# Patient Record
Sex: MALE | Race: WHITE | Employment: FULL TIME | URBAN - METROPOLITAN AREA
[De-identification: names, ages, dates, MRNs, and addresses within clinical notes are randomized per-mention and may not be internally consistent; named-entity substitution may affect disease eponyms.]

---

## 2022-12-10 ENCOUNTER — HOSPITAL ENCOUNTER (OUTPATIENT)
Age: 37
Discharge: HOME | End: 2022-12-10
Payer: COMMERCIAL

## 2022-12-10 ENCOUNTER — OFFICE VISIT (OUTPATIENT)
Dept: FAMILY MEDICINE CLINIC | Facility: CLINIC | Age: 37
End: 2022-12-10
Payer: COMMERCIAL

## 2022-12-10 VITALS
TEMPERATURE: 98 F | RESPIRATION RATE: 18 BRPM | DIASTOLIC BLOOD PRESSURE: 78 MMHG | SYSTOLIC BLOOD PRESSURE: 124 MMHG | HEART RATE: 72 BPM | OXYGEN SATURATION: 99 %

## 2022-12-10 VITALS
OXYGEN SATURATION: 97 % | SYSTOLIC BLOOD PRESSURE: 112 MMHG | BODY MASS INDEX: 27.35 KG/M2 | DIASTOLIC BLOOD PRESSURE: 72 MMHG | TEMPERATURE: 98 F | HEART RATE: 72 BPM | RESPIRATION RATE: 16 BRPM | HEIGHT: 71 IN | WEIGHT: 195.38 LBS

## 2022-12-10 DIAGNOSIS — Z02.9 ENCOUNTERS FOR ADMINISTRATIVE PURPOSE: Primary | ICD-10-CM

## 2022-12-10 DIAGNOSIS — B02.8 HERPES ZOSTER WITH COMPLICATION: Primary | ICD-10-CM

## 2022-12-10 PROCEDURE — 3078F DIAST BP <80 MM HG: CPT | Performed by: NURSE PRACTITIONER

## 2022-12-10 PROCEDURE — 99203 OFFICE O/P NEW LOW 30 MIN: CPT

## 2022-12-10 PROCEDURE — 3074F SYST BP LT 130 MM HG: CPT | Performed by: NURSE PRACTITIONER

## 2022-12-10 PROCEDURE — 3008F BODY MASS INDEX DOCD: CPT | Performed by: NURSE PRACTITIONER

## 2022-12-10 RX ORDER — OFLOXACIN 3 MG/ML
2 SOLUTION/ DROPS OPHTHALMIC 4 TIMES DAILY
Qty: 5 ML | Refills: 0 | Status: SHIPPED | OUTPATIENT
Start: 2022-12-10 | End: 2022-12-15

## 2022-12-10 RX ORDER — VALACYCLOVIR HYDROCHLORIDE 1 G/1
1000 TABLET, FILM COATED ORAL 3 TIMES DAILY
Qty: 21 TABLET | Refills: 0 | Status: SHIPPED | OUTPATIENT
Start: 2022-12-10 | End: 2022-12-17

## 2022-12-10 NOTE — ED INITIAL ASSESSMENT (HPI)
Pt presents with right eye redness, swelling to upper and lower eyelids, and rash x 3-4 days, c/o pain on right eye, no visual change.

## 2022-12-10 NOTE — DISCHARGE INSTRUCTIONS
Follow-up with your ophthalmologist take the first available appointment. You may take over-the-counter antihistamine such as 24-hour Zyrtec or Claritin to help with any itchy skin and avoid touching the outer eye and touching the eye itself as the lesions can spread directly onto the eye   If you develop any blurry vision vision changes increase in swelling around the eye any lesions directly on the eye any pus or drainage from the eye feel like you are about to lose vision severe headache or fever neck stiffness nausea or vomiting go to the nearest emergency department.

## 2023-01-16 ENCOUNTER — OFFICE VISIT (OUTPATIENT)
Dept: INTERNAL MEDICINE CLINIC | Facility: CLINIC | Age: 38
End: 2023-01-16
Payer: COMMERCIAL

## 2023-01-16 VITALS
HEIGHT: 71 IN | SYSTOLIC BLOOD PRESSURE: 116 MMHG | DIASTOLIC BLOOD PRESSURE: 62 MMHG | BODY MASS INDEX: 27.3 KG/M2 | RESPIRATION RATE: 15 BRPM | TEMPERATURE: 97 F | OXYGEN SATURATION: 98 % | WEIGHT: 195 LBS | HEART RATE: 101 BPM

## 2023-01-16 DIAGNOSIS — L64.9 ANDROGENETIC ALOPECIA: ICD-10-CM

## 2023-01-16 DIAGNOSIS — E66.3 OVERWEIGHT (BMI 25.0-29.9): ICD-10-CM

## 2023-01-16 DIAGNOSIS — Z00.00 HEALTH MAINTENANCE EXAMINATION: Primary | ICD-10-CM

## 2023-01-16 DIAGNOSIS — J30.2 SEASONAL ALLERGIES: ICD-10-CM

## 2023-01-16 DIAGNOSIS — Z23 NEED FOR INFLUENZA VACCINATION: ICD-10-CM

## 2023-01-16 PROBLEM — E78.2 MIXED HYPERLIPIDEMIA: Status: ACTIVE | Noted: 2023-01-16

## 2023-01-16 PROBLEM — R74.8 ELEVATED LIVER ENZYMES: Status: ACTIVE | Noted: 2023-01-16

## 2023-01-16 PROBLEM — E03.8 SUBCLINICAL HYPOTHYROIDISM: Status: ACTIVE | Noted: 2023-01-16

## 2023-01-16 LAB
ALBUMIN SERPL-MCNC: 4.5 G/DL (ref 3.4–5)
ALBUMIN/GLOB SERPL: 1.2 {RATIO} (ref 1–2)
ALP LIVER SERPL-CCNC: 61 U/L
ALT SERPL-CCNC: 66 U/L
ANION GAP SERPL CALC-SCNC: 5 MMOL/L (ref 0–18)
AST SERPL-CCNC: 32 U/L (ref 15–37)
BASOPHILS # BLD AUTO: 0.05 X10(3) UL (ref 0–0.2)
BASOPHILS NFR BLD AUTO: 1 %
BILIRUB SERPL-MCNC: 0.8 MG/DL (ref 0.1–2)
BUN BLD-MCNC: 12 MG/DL (ref 7–18)
BUN/CREAT SERPL: 10.9 (ref 10–20)
CALCIUM BLD-MCNC: 9.7 MG/DL (ref 8.5–10.1)
CHLORIDE SERPL-SCNC: 105 MMOL/L (ref 98–112)
CHOLEST SERPL-MCNC: 232 MG/DL (ref ?–200)
CO2 SERPL-SCNC: 26 MMOL/L (ref 21–32)
CREAT BLD-MCNC: 1.1 MG/DL
DEPRECATED RDW RBC AUTO: 40.9 FL (ref 35.1–46.3)
EOSINOPHIL # BLD AUTO: 0.51 X10(3) UL (ref 0–0.7)
EOSINOPHIL NFR BLD AUTO: 10.2 %
ERYTHROCYTE [DISTWIDTH] IN BLOOD BY AUTOMATED COUNT: 12.3 % (ref 11–15)
EST. AVERAGE GLUCOSE BLD GHB EST-MCNC: 100 MG/DL (ref 68–126)
FASTING PATIENT LIPID ANSWER: YES
FASTING STATUS PATIENT QL REPORTED: YES
GFR SERPLBLD BASED ON 1.73 SQ M-ARVRAT: 88 ML/MIN/1.73M2 (ref 60–?)
GLOBULIN PLAS-MCNC: 3.9 G/DL (ref 2.8–4.4)
GLUCOSE BLD-MCNC: 90 MG/DL (ref 70–99)
HBA1C MFR BLD: 5.1 % (ref ?–5.7)
HCT VFR BLD AUTO: 44.7 %
HCV AB SERPL QL IA: NONREACTIVE
HDLC SERPL-MCNC: 40 MG/DL (ref 40–59)
HGB BLD-MCNC: 15.1 G/DL
IMM GRANULOCYTES # BLD AUTO: 0.01 X10(3) UL (ref 0–1)
IMM GRANULOCYTES NFR BLD: 0.2 %
LDLC SERPL CALC-MCNC: 141 MG/DL (ref ?–100)
LYMPHOCYTES # BLD AUTO: 2.02 X10(3) UL (ref 1–4)
LYMPHOCYTES NFR BLD AUTO: 40.2 %
MCH RBC QN AUTO: 30.8 PG (ref 26–34)
MCHC RBC AUTO-ENTMCNC: 33.8 G/DL (ref 31–37)
MCV RBC AUTO: 91 FL
MONOCYTES # BLD AUTO: 0.4 X10(3) UL (ref 0.1–1)
MONOCYTES NFR BLD AUTO: 8 %
NEUTROPHILS # BLD AUTO: 2.03 X10 (3) UL (ref 1.5–7.7)
NEUTROPHILS # BLD AUTO: 2.03 X10(3) UL (ref 1.5–7.7)
NEUTROPHILS NFR BLD AUTO: 40.4 %
NONHDLC SERPL-MCNC: 192 MG/DL (ref ?–130)
OSMOLALITY SERPL CALC.SUM OF ELEC: 281 MOSM/KG (ref 275–295)
PLATELET # BLD AUTO: 220 10(3)UL (ref 150–450)
POTASSIUM SERPL-SCNC: 4.1 MMOL/L (ref 3.5–5.1)
PROT SERPL-MCNC: 8.4 G/DL (ref 6.4–8.2)
RBC # BLD AUTO: 4.91 X10(6)UL
SODIUM SERPL-SCNC: 136 MMOL/L (ref 136–145)
T4 FREE SERPL-MCNC: 0.9 NG/DL (ref 0.8–1.7)
TRIGL SERPL-MCNC: 279 MG/DL (ref 30–149)
TSI SER-ACNC: 4.05 MIU/ML (ref 0.36–3.74)
VLDLC SERPL CALC-MCNC: 53 MG/DL (ref 0–30)
WBC # BLD AUTO: 5 X10(3) UL (ref 4–11)

## 2023-01-16 PROCEDURE — 90471 IMMUNIZATION ADMIN: CPT | Performed by: FAMILY MEDICINE

## 2023-01-16 PROCEDURE — 99385 PREV VISIT NEW AGE 18-39: CPT | Performed by: FAMILY MEDICINE

## 2023-01-16 PROCEDURE — 84439 ASSAY OF FREE THYROXINE: CPT | Performed by: FAMILY MEDICINE

## 2023-01-16 PROCEDURE — 80050 GENERAL HEALTH PANEL: CPT | Performed by: FAMILY MEDICINE

## 2023-01-16 PROCEDURE — 83036 HEMOGLOBIN GLYCOSYLATED A1C: CPT | Performed by: FAMILY MEDICINE

## 2023-01-16 PROCEDURE — 3074F SYST BP LT 130 MM HG: CPT | Performed by: FAMILY MEDICINE

## 2023-01-16 PROCEDURE — 90686 IIV4 VACC NO PRSV 0.5 ML IM: CPT | Performed by: FAMILY MEDICINE

## 2023-01-16 PROCEDURE — 36415 COLL VENOUS BLD VENIPUNCTURE: CPT | Performed by: FAMILY MEDICINE

## 2023-01-16 PROCEDURE — 3008F BODY MASS INDEX DOCD: CPT | Performed by: FAMILY MEDICINE

## 2023-01-16 PROCEDURE — 86803 HEPATITIS C AB TEST: CPT | Performed by: FAMILY MEDICINE

## 2023-01-16 PROCEDURE — 3078F DIAST BP <80 MM HG: CPT | Performed by: FAMILY MEDICINE

## 2023-01-16 PROCEDURE — 80061 LIPID PANEL: CPT | Performed by: FAMILY MEDICINE

## 2023-01-16 PROCEDURE — 87389 HIV-1 AG W/HIV-1&-2 AB AG IA: CPT | Performed by: FAMILY MEDICINE

## 2023-01-16 NOTE — PATIENT INSTRUCTIONS
PATIENT INSTRUCTIONS    Thank you for seeing me today, it was a pleasure taking care of you. Please check out at the  and schedule a follow up appointment. Return in about 1 year (around 1/16/2024) for physical.  Please get your labs drawn - you may need to schedule a lab appointment if this was not completed at your recent doctor's visit. The following imaging studies were ordered: None  Please also follow up with the following specialists: None. Can chat with family about colon cancer history. Please fill out the advance directive information (power of  documents) and bring a copy to our clinic.   Healthy diet and exercise  COVID booster at some point in time    Best,   Dr. Lian Dorado

## 2023-01-16 NOTE — ASSESSMENT & PLAN NOTE
Using rogaine. Discussed finasteride, can hold off for now. Can consider referral to dermatology if he would like.

## 2023-01-16 NOTE — ASSESSMENT & PLAN NOTE
Exercise and diet advised. CBC, CMP, lipid panel, Hba1c, TSH, HIV screen, hepatitis C screen  Flu shot today. Advanced directive information provided. Advised COVID vaccine booster. May benefit from earlier colonoscopy given family history - will discuss at 35.

## 2023-07-21 ENCOUNTER — OFFICE VISIT (OUTPATIENT)
Dept: FAMILY MEDICINE CLINIC | Facility: CLINIC | Age: 38
End: 2023-07-21
Payer: COMMERCIAL

## 2023-07-21 VITALS
OXYGEN SATURATION: 98 % | SYSTOLIC BLOOD PRESSURE: 119 MMHG | TEMPERATURE: 99 F | DIASTOLIC BLOOD PRESSURE: 83 MMHG | HEART RATE: 78 BPM | BODY MASS INDEX: 26.6 KG/M2 | HEIGHT: 71 IN | RESPIRATION RATE: 18 BRPM | WEIGHT: 190 LBS

## 2023-07-21 DIAGNOSIS — R05.9 COUGH IN ADULT: ICD-10-CM

## 2023-07-21 DIAGNOSIS — J01.90 SUBACUTE SINUSITIS, UNSPECIFIED LOCATION: Primary | ICD-10-CM

## 2023-07-21 PROCEDURE — 3008F BODY MASS INDEX DOCD: CPT | Performed by: NURSE PRACTITIONER

## 2023-07-21 PROCEDURE — 3074F SYST BP LT 130 MM HG: CPT | Performed by: NURSE PRACTITIONER

## 2023-07-21 PROCEDURE — 3079F DIAST BP 80-89 MM HG: CPT | Performed by: NURSE PRACTITIONER

## 2023-07-21 PROCEDURE — 99213 OFFICE O/P EST LOW 20 MIN: CPT | Performed by: NURSE PRACTITIONER

## 2023-07-21 RX ORDER — AMOXICILLIN AND CLAVULANATE POTASSIUM 875; 125 MG/1; MG/1
1 TABLET, FILM COATED ORAL 2 TIMES DAILY
Qty: 14 TABLET | Refills: 0 | Status: SHIPPED | OUTPATIENT
Start: 2023-07-21 | End: 2023-07-28

## 2023-07-21 RX ORDER — BENZONATATE 200 MG/1
200 CAPSULE ORAL 3 TIMES DAILY PRN
Qty: 30 CAPSULE | Refills: 0 | Status: SHIPPED | OUTPATIENT
Start: 2023-07-21

## 2023-07-21 RX ORDER — FLUTICASONE PROPIONATE 50 MCG
2 SPRAY, SUSPENSION (ML) NASAL NIGHTLY
Qty: 1 EACH | Refills: 0 | Status: SHIPPED | OUTPATIENT
Start: 2023-07-21

## 2023-07-21 NOTE — PATIENT INSTRUCTIONS
If prescribed, take antibiotics as directed. Finish all the medication even if you feel better. Probiotics or 1-2 servings of yogurt daily during antibiotic use will help decrease stomach upset and restore good bacteria to the gut/prevent antibiotic associated diarrhea. Separate times by at least 2-4 hours. General comfort measures:  Get rest!  Hydrate! (cold or hot based on comfort). Drink lots of water or other non dehydrating liquids to help with illness. Salty foods, soups and tea can help with throat pain. Hand washing-use hand  or wash hands frequently, cover your cough or sneeze, do not share towels or drinks with others. Salt water gargles (1 tsp. Salt in 6 oz lukewarm water): gargle for 2 minutes, repeat every 15 minutes as needed to help decrease swelling and relieve pain. Use humidified air, steamy showers/baths and use vaporizer in sleeping quarters to keep secretions thin. Avoid smoking. Symptom management:    Nasal congestion/Post-nasal-drip: Saline nasal spray to nostrils to help remove drainage or an antihistamine to help dry up drainage. Sinus congestion/Post-nasal-drip: OTC Nasacort or Flonase (steroid nasal spray) nightly for 2 weeks. May take Sudafed (D) or Sudafed-PE, if not contraindicated (do not take if you have HTN). Pain/discomfort:  May use Tylenol or Ibuprofen, if not contraindicated. Cough: May take DM-dextromethorophan over the counter (long lasting). Ex: Delsym  Chest congestion:  May take guaifenesin with a lot of water. Ex:  Plain Mucinex. Sore throat:  Cepacol lozenges or Chloroseptic throat spray (active ingredient Benzocaine). Follow up in a few days if worsening symptoms. Seek immediate care if inability to swallow or breathe.

## 2024-09-06 ENCOUNTER — OFFICE VISIT (OUTPATIENT)
Dept: INTERNAL MEDICINE CLINIC | Facility: CLINIC | Age: 39
End: 2024-09-06
Payer: COMMERCIAL

## 2024-09-06 VITALS
OXYGEN SATURATION: 99 % | DIASTOLIC BLOOD PRESSURE: 80 MMHG | BODY MASS INDEX: 26.74 KG/M2 | HEART RATE: 68 BPM | SYSTOLIC BLOOD PRESSURE: 126 MMHG | WEIGHT: 191 LBS | TEMPERATURE: 98 F | HEIGHT: 71 IN

## 2024-09-06 DIAGNOSIS — Z00.00 HEALTH MAINTENANCE EXAMINATION: Primary | ICD-10-CM

## 2024-09-06 DIAGNOSIS — R74.8 ELEVATED LIVER ENZYMES: ICD-10-CM

## 2024-09-06 DIAGNOSIS — E03.8 SUBCLINICAL HYPOTHYROIDISM: ICD-10-CM

## 2024-09-06 DIAGNOSIS — E78.2 MIXED HYPERLIPIDEMIA: ICD-10-CM

## 2024-09-06 DIAGNOSIS — E66.3 OVERWEIGHT (BMI 25.0-29.9): ICD-10-CM

## 2024-09-06 DIAGNOSIS — J30.2 SEASONAL ALLERGIES: ICD-10-CM

## 2024-09-06 DIAGNOSIS — L64.9 ANDROGENETIC ALOPECIA: ICD-10-CM

## 2024-09-06 LAB
ALBUMIN SERPL-MCNC: 4.7 G/DL (ref 3.2–4.8)
ALBUMIN/GLOB SERPL: 1.3 {RATIO} (ref 1–2)
ALP LIVER SERPL-CCNC: 55 U/L
ALT SERPL-CCNC: 59 U/L
ANION GAP SERPL CALC-SCNC: 8 MMOL/L (ref 0–18)
AST SERPL-CCNC: 30 U/L (ref ?–34)
BILIRUB SERPL-MCNC: 0.6 MG/DL (ref 0.3–1.2)
BUN BLD-MCNC: 9 MG/DL (ref 9–23)
BUN/CREAT SERPL: 7.6 (ref 10–20)
CALCIUM BLD-MCNC: 10 MG/DL (ref 8.7–10.4)
CHLORIDE SERPL-SCNC: 104 MMOL/L (ref 98–112)
CHOLEST SERPL-MCNC: 218 MG/DL (ref ?–200)
CO2 SERPL-SCNC: 26 MMOL/L (ref 21–32)
CREAT BLD-MCNC: 1.19 MG/DL
EGFRCR SERPLBLD CKD-EPI 2021: 79 ML/MIN/1.73M2 (ref 60–?)
FASTING PATIENT LIPID ANSWER: YES
FASTING STATUS PATIENT QL REPORTED: YES
GLOBULIN PLAS-MCNC: 3.6 G/DL (ref 2–3.5)
GLUCOSE BLD-MCNC: 90 MG/DL (ref 70–99)
HDLC SERPL-MCNC: 38 MG/DL (ref 40–59)
LDLC SERPL CALC-MCNC: 119 MG/DL (ref ?–100)
NONHDLC SERPL-MCNC: 180 MG/DL (ref ?–130)
OSMOLALITY SERPL CALC.SUM OF ELEC: 284 MOSM/KG (ref 275–295)
POTASSIUM SERPL-SCNC: 5 MMOL/L (ref 3.5–5.1)
PROT SERPL-MCNC: 8.3 G/DL (ref 5.7–8.2)
SODIUM SERPL-SCNC: 138 MMOL/L (ref 136–145)
TRIGL SERPL-MCNC: 350 MG/DL (ref 30–149)
TSI SER-ACNC: 3.48 MIU/ML (ref 0.55–4.78)
VLDLC SERPL CALC-MCNC: 63 MG/DL (ref 0–30)

## 2024-09-06 PROCEDURE — 80061 LIPID PANEL: CPT | Performed by: FAMILY MEDICINE

## 2024-09-06 PROCEDURE — 80053 COMPREHEN METABOLIC PANEL: CPT | Performed by: FAMILY MEDICINE

## 2024-09-06 PROCEDURE — 84443 ASSAY THYROID STIM HORMONE: CPT | Performed by: FAMILY MEDICINE

## 2024-09-06 PROCEDURE — 99396 PREV VISIT EST AGE 40-64: CPT | Performed by: FAMILY MEDICINE

## 2024-09-06 PROCEDURE — 3074F SYST BP LT 130 MM HG: CPT | Performed by: FAMILY MEDICINE

## 2024-09-06 PROCEDURE — 3008F BODY MASS INDEX DOCD: CPT | Performed by: FAMILY MEDICINE

## 2024-09-06 PROCEDURE — 3079F DIAST BP 80-89 MM HG: CPT | Performed by: FAMILY MEDICINE

## 2024-09-06 RX ORDER — MINOXIDIL 5 %
FOAM (GRAM) TOPICAL
COMMUNITY

## 2024-09-06 NOTE — PATIENT INSTRUCTIONS
PATIENT INSTRUCTIONS    Thank you for seeing me today, it was a pleasure taking care of you.  Please check out at the  and schedule a follow up appointment.  Return in about 1 year (around 9/6/2025) for physical.  Please remember that the veronica period for all appointments is 5 minutes. This is to help maximize the amount of time that we can spend together at our visits.    Please get your labs drawn - you may need to schedule a lab appointment if this was not completed at your recent doctor's visit.  The following imaging studies were ordered: None  Please also follow up with the following specialists: None  Please fill out the advance directive information (power of  documents) and bring a copy to our clinic.  Continue to focus on a healthy diet and exercise  Mediterranean diet  Can continue current medications    Best,   Dr. Zhou

## 2024-09-06 NOTE — PROGRESS NOTES
FAMILY MEDICINE CLINIC NOTE    HPI  Bc Gallegos is a 39 year old male presenting for physical    #Health Maintenance  -Diet: Could be better. Picky eater. Not a big fan of vegetables. Does eat whole grain and fruits.   -Exercise: 2 days a week - running on treadmill.   -Lung cancer screen: Not indicated  -Colon cancer screen: Reports update - no family history of colon cancer. Will screen at 45.   -Prostate cancer screen: Not indicated  -Aortic aneurysm screen: Has family history, will screen at 65.   -Statin:  Will check lipid panel  -ASA: Not indicated  -HIV screen: 1/2023 negative  -Hep C screen: 1/2023 negative  -Gonorrhea/chlamydia:  Not indicated  -Syphillis: Not indicated  -TB: Not indicated  -Tobacco/alcohol: Per below  -Depression: PHQ-2 score of 0 (score >/= 3 do PHQ-9)  -Advanced Directive: Indicated     #Immunizations  -Tdap: 11/2018  -Flu shot: Indicated  -PCV13: Not indicated   -PCV20: Not indicated   -PPSV23: Not indicated   -HPV: Not indicated  -RZV (preferred) or VZL: Indicated   -RSV: Not indicated  -COVID: Pfizer    #Subclinical hypothyroidism  -TSH 1/2023     #HLD  -will monitor lipid panel    #Androgenetic alopecia  -rogaine    #Elevated liver enzymes  -will monitor    #Seasonal allergies  -Otc allergy medications as needed  -this year well managed     #Patient Care Team  Patient Care Team:  Ed Zhou MD as PCP - General (Family Medicine)    ROS  GENERAL: No fever/chills, no recent weight loss   HEENT: No visual changes, no changes in hearing, no sore throats  NECK: No pain, no swelling  RESP: No cough, no SOB  CV: No chest pain, no palpitations  GI: No abd pain, no N/V/D  MSK: No edema, no pain  SKIN: No new rashes  NEURO: No numbness, no tingling, no HA    HEALTH MAINTENANCE CHECKLIST  Health Maintenance Topics with due status: Overdue       Topic Date Due    Annual Depression Screening 01/01/2024    Annual Physical 01/16/2024    COVID-19 Vaccine 09/01/2024       ALLERGIES  No  Known Allergies    MEDICATIONS  Current Outpatient Medications   Medication Sig Dispense Refill    Minoxidil (ROGAINE MENS) 5 % External Foam Apply topically.         ACTIVE PROBLEM  Patient Active Problem List   Diagnosis    Androgenetic alopecia    Seasonal allergies    Health maintenance examination    Overweight (BMI 25.0-29.9)    Mixed hyperlipidemia    Subclinical hypothyroidism    Elevated liver enzymes       PAST MEDICAL HISTORY  Past Medical History:    Androgenetic alopecia    Mixed hyperlipidemia    Seasonal allergies    Shingles       PAST SOCIAL HISTORY  Social History     Socioeconomic History    Marital status:      Spouse name: Not on file    Number of children: Not on file    Years of education: Not on file    Highest education level: Not on file   Occupational History    Not on file   Tobacco Use    Smoking status: Never    Smokeless tobacco: Never   Vaping Use    Vaping status: Never Used   Substance and Sexual Activity    Alcohol use: Yes     Comment: Occasional - weekends - 4 drinks over the week    Drug use: Never    Sexual activity: Yes     Partners: Female     Birth control/protection: OCP   Other Topics Concern    Not on file   Social History Narrative    Relationships: Wife - Rosalba     Children: Aron (5M), Mica (3F)     Pets: None    School: N/A    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     Origin: From the [facility].     Interests: Enjoys running. Biking - hybrid with kids.     Spiritual: Not Anabaptism, not spiritual     Social Determinants of Health     Financial Resource Strain: Not on file   Food Insecurity: Not on file   Transportation Needs: Not on file   Physical Activity: Not on file   Stress: Not on file   Social Connections: Not on file   Housing Stability: Not on file       PAST SURGICAL HISTORY  History reviewed. No pertinent surgical history.    PAST FAMILY HISTORY  Family History   Problem Relation Age of Onset     Hyperlipidemia Mother     Kidney Disease Mother         Congenital    Heart Attack Mother 64    Skin cancer Father         Basal cell    Thyroid disease Father     Allergies Father     Hyperlipidemia Father     No Known Problems Sister     No Known Problems Brother     COPD Maternal Grandmother     Hyperlipidemia Maternal Grandmother     Other (Aortic aneurysm) Maternal Grandmother     Cancer Maternal Grandfather         Bladder    Cancer Paternal Grandmother         Lung    Cancer Paternal Grandfather         Throat    Prostate Cancer Neg        PHYSICAL EXAM  Vitals:    09/06/24 0804   BP: 126/80   Pulse: 68   Temp: 97.8 °F (36.6 °C)   SpO2: 99%   Weight: 191 lb (86.6 kg)   Height: 5' 11\" (1.803 m)      Body mass index is 26.64 kg/m².    GENERAL: NAD  HEENT: Moist mucous membranes, no tonsillar swelling or erythema, PERRLA bilat, TM translucent and non-bulging  NECK: Supple, non-tender  RESP: CTAB, no wheezing, no rales, no ronchi  CV: RRR, no murmurs  GI: Soft, non-distended, non-tender, no guarding, no rebound, no masses  MSK: No edema  SKIN: Warm and dry, no rashes  NEURO: Answering questions appropriately    LABS  Lab Results   Component Value Date    WBC 5.0 01/16/2023    HGB 15.1 01/16/2023    HCT 44.7 01/16/2023    .0 01/16/2023    NEPERCENT 40.4 01/16/2023    LYPERCENT 40.2 01/16/2023    MOPERCENT 8.0 01/16/2023    EOPERCENT 10.2 01/16/2023    BAPERCENT 1.0 01/16/2023    NE 2.03 01/16/2023    LYMABS 2.02 01/16/2023    MOABSO 0.40 01/16/2023    EOABSO 0.51 01/16/2023    BAABSO 0.05 01/16/2023       Lab Results   Component Value Date     01/16/2023    K 4.1 01/16/2023     01/16/2023    CO2 26.0 01/16/2023    ANIONGAP 5 01/16/2023    BUN 12 01/16/2023    CREATSERUM 1.10 01/16/2023    BUNCREA 10.9 01/16/2023    GLU 90 01/16/2023    CA 9.7 01/16/2023    OSMOCALC 281 01/16/2023    ALT 66 (H) 01/16/2023    AST 32 01/16/2023    ALKPHO 61 01/16/2023    BILT 0.8 01/16/2023    TP 8.4 (H) 01/16/2023     ALB 4.5 01/16/2023    GLOBULIN 3.9 01/16/2023    ELECTAG 1.2 01/16/2023    FASTING Yes 01/16/2023    FASTING Yes 01/16/2023         Lab Results   Component Value Date    CHOLEST 232 (H) 01/16/2023    TRIG 279 (H) 01/16/2023    HDL 40 01/16/2023     (H) 01/16/2023    VLDL 53 (H) 01/16/2023    NONHDLC 192 (H) 01/16/2023        DIAGNOSTICS    ASSESSMENT/PLAN  Problem List Items Addressed This Visit          Cardiac and Vasculature    Mixed hyperlipidemia     Monitor CMP and lipid panel.  Discussed healthy diet and exercise  Mother recently passed away of a heart attack at the age of 64  Discussed ASCVD risk score and how that influences management of statin.  Can consider starting statin earlier if this is patient's preference         Relevant Orders    Lipid Panel    Comp Metabolic Panel (14)       Endocrine and Metabolic    Overweight (BMI 25.0-29.9)     Healthy diet and exercise advised.         Relevant Orders    Lipid Panel    TSH W Reflex To Free T4    Comp Metabolic Panel (14)    Subclinical hypothyroidism     Monitor TSH         Relevant Orders    TSH W Reflex To Free T4       Gastrointestinal and Abdominal    Elevated liver enzymes     Monitor liver enzymes.  Weight loss advised         Relevant Orders    Comp Metabolic Panel (14)       EarNoseThroat    Seasonal allergies     Using over the counter allergy medication as needed - can continue.            Skin    Androgenetic alopecia     Very mild androgenetic alopecia  Using rogaine.   Discussed finasteride, can hold off for now.         Relevant Medications    Minoxidil (ROGAINE MENS) 5 % External Foam       Health Encounters    Health maintenance examination - Primary     Exercise and diet advised.  CMP, lipid panel, Hba1c, TSH,  Flu shot during the fall  Advanced directive information provided.  Advised COVID vaccine booster.  No need for early colonoscopy - family history corrected - grandfather with a history of bladder cancer. No family history  of colon cancer.          Relevant Orders    Lipid Panel    TSH W Reflex To Free T4    Comp Metabolic Panel (14)       Return in about 1 year (around 9/6/2025) for physical.    Ed Zhou MD  Family Medicine

## 2024-09-06 NOTE — ASSESSMENT & PLAN NOTE
Monitor CMP and lipid panel.  Discussed healthy diet and exercise  Mother recently passed away of a heart attack at the age of 64  Discussed ASCVD risk score and how that influences management of statin.  Can consider starting statin earlier if this is patient's preference

## 2024-09-06 NOTE — ASSESSMENT & PLAN NOTE
Exercise and diet advised.  CMP, lipid panel, Hba1c, TSH,  Flu shot during the fall  Advanced directive information provided.  Advised COVID vaccine booster.  No need for early colonoscopy - family history corrected - grandfather with a history of bladder cancer. No family history of colon cancer.

## 2024-09-22 PROBLEM — E03.8 SUBCLINICAL HYPOTHYROIDISM: Status: RESOLVED | Noted: 2023-01-16 | Resolved: 2024-09-22

## 2025-01-11 ENCOUNTER — HOSPITAL ENCOUNTER (EMERGENCY)
Facility: HOSPITAL | Age: 40
Discharge: HOME | End: 2025-01-12
Attending: EMERGENCY MEDICINE
Payer: COMMERCIAL
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
## 2025-01-11 DIAGNOSIS — T18.128A FOOD IMPACTION OF ESOPHAGUS, INITIAL ENCOUNTER: Primary | ICD-10-CM

## 2025-01-12 ENCOUNTER — ANESTHESIA EVENT (OUTPATIENT)
Dept: ENDOSCOPY | Facility: HOSPITAL | Age: 40
End: 2025-01-12
Payer: COMMERCIAL

## 2025-01-12 ENCOUNTER — ANESTHESIA (OUTPATIENT)
Dept: ENDOSCOPY | Facility: HOSPITAL | Age: 40
End: 2025-01-12
Payer: COMMERCIAL

## 2025-01-12 VITALS
RESPIRATION RATE: 18 BRPM | WEIGHT: 195 LBS | BODY MASS INDEX: 27.3 KG/M2 | HEIGHT: 71 IN | HEART RATE: 88 BPM | TEMPERATURE: 98 F | DIASTOLIC BLOOD PRESSURE: 91 MMHG | OXYGEN SATURATION: 95 % | SYSTOLIC BLOOD PRESSURE: 128 MMHG
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
## 2025-01-12 PROBLEM — T18.128A FOOD IMPACTION OF ESOPHAGUS, INITIAL ENCOUNTER: Status: ACTIVE | Noted: 2025-01-12

## 2025-01-12 PROBLEM — T18.128A FOOD IMPACTION OF ESOPHAGUS: Status: ACTIVE | Noted: 2025-01-12

## 2025-01-12 PROCEDURE — 0DB18ZX EXCISION OF UPPER ESOPHAGUS, VIA NATURAL OR ARTIFICIAL OPENING ENDOSCOPIC, DIAGNOSTIC: ICD-10-PCS | Performed by: INTERNAL MEDICINE

## 2025-01-12 PROCEDURE — 43247 EGD REMOVE FOREIGN BODY: CPT | Performed by: INTERNAL MEDICINE

## 2025-01-12 PROCEDURE — 0DC28ZZ EXTIRPATION OF MATTER FROM MIDDLE ESOPHAGUS, VIA NATURAL OR ARTIFICIAL OPENING ENDOSCOPIC: ICD-10-PCS | Performed by: INTERNAL MEDICINE

## 2025-01-12 PROCEDURE — 0DB28ZX EXCISION OF MIDDLE ESOPHAGUS, VIA NATURAL OR ARTIFICIAL OPENING ENDOSCOPIC, DIAGNOSTIC: ICD-10-PCS | Performed by: INTERNAL MEDICINE

## 2025-01-12 PROCEDURE — 99204 OFFICE O/P NEW MOD 45 MIN: CPT | Performed by: INTERNAL MEDICINE

## 2025-01-12 RX ORDER — MORPHINE SULFATE 4 MG/ML
2 INJECTION, SOLUTION INTRAMUSCULAR; INTRAVENOUS EVERY 10 MIN PRN
Status: DISCONTINUED | OUTPATIENT
Start: 2025-01-12 | End: 2025-01-12 | Stop reason: HOSPADM

## 2025-01-12 RX ORDER — NALOXONE HYDROCHLORIDE 0.4 MG/ML
80 INJECTION, SOLUTION INTRAMUSCULAR; INTRAVENOUS; SUBCUTANEOUS AS NEEDED
Status: DISCONTINUED | OUTPATIENT
Start: 2025-01-12 | End: 2025-01-12 | Stop reason: HOSPADM

## 2025-01-12 RX ORDER — SODIUM CHLORIDE, SODIUM LACTATE, POTASSIUM CHLORIDE, CALCIUM CHLORIDE 600; 310; 30; 20 MG/100ML; MG/100ML; MG/100ML; MG/100ML
INJECTION, SOLUTION INTRAVENOUS CONTINUOUS
Status: DISCONTINUED | OUTPATIENT
Start: 2025-01-12 | End: 2025-01-12 | Stop reason: HOSPADM

## 2025-01-12 RX ORDER — MIDAZOLAM HYDROCHLORIDE 1 MG/ML
INJECTION INTRAMUSCULAR; INTRAVENOUS AS NEEDED
Status: DISCONTINUED | OUTPATIENT
Start: 2025-01-12 | End: 2025-01-12 | Stop reason: SURG

## 2025-01-12 RX ORDER — HYDROMORPHONE HYDROCHLORIDE 1 MG/ML
0.2 INJECTION, SOLUTION INTRAMUSCULAR; INTRAVENOUS; SUBCUTANEOUS EVERY 5 MIN PRN
Status: DISCONTINUED | OUTPATIENT
Start: 2025-01-12 | End: 2025-01-12 | Stop reason: HOSPADM

## 2025-01-12 RX ORDER — HYDROMORPHONE HYDROCHLORIDE 1 MG/ML
0.4 INJECTION, SOLUTION INTRAMUSCULAR; INTRAVENOUS; SUBCUTANEOUS EVERY 5 MIN PRN
Status: DISCONTINUED | OUTPATIENT
Start: 2025-01-12 | End: 2025-01-12 | Stop reason: HOSPADM

## 2025-01-12 RX ORDER — SODIUM CHLORIDE, SODIUM LACTATE, POTASSIUM CHLORIDE, CALCIUM CHLORIDE 600; 310; 30; 20 MG/100ML; MG/100ML; MG/100ML; MG/100ML
INJECTION, SOLUTION INTRAVENOUS CONTINUOUS PRN
Status: DISCONTINUED | OUTPATIENT
Start: 2025-01-12 | End: 2025-01-12 | Stop reason: SURG

## 2025-01-12 RX ORDER — PROCHLORPERAZINE EDISYLATE 5 MG/ML
5 INJECTION INTRAMUSCULAR; INTRAVENOUS EVERY 8 HOURS PRN
Status: DISCONTINUED | OUTPATIENT
Start: 2025-01-12 | End: 2025-01-12 | Stop reason: HOSPADM

## 2025-01-12 RX ORDER — ONDANSETRON 2 MG/ML
INJECTION INTRAMUSCULAR; INTRAVENOUS AS NEEDED
Status: DISCONTINUED | OUTPATIENT
Start: 2025-01-12 | End: 2025-01-12 | Stop reason: SURG

## 2025-01-12 RX ORDER — MORPHINE SULFATE 10 MG/ML
6 INJECTION, SOLUTION INTRAMUSCULAR; INTRAVENOUS EVERY 10 MIN PRN
Status: DISCONTINUED | OUTPATIENT
Start: 2025-01-12 | End: 2025-01-12 | Stop reason: HOSPADM

## 2025-01-12 RX ORDER — ONDANSETRON 2 MG/ML
4 INJECTION INTRAMUSCULAR; INTRAVENOUS EVERY 6 HOURS PRN
Status: DISCONTINUED | OUTPATIENT
Start: 2025-01-12 | End: 2025-01-12 | Stop reason: HOSPADM

## 2025-01-12 RX ORDER — HYDROMORPHONE HYDROCHLORIDE 1 MG/ML
0.6 INJECTION, SOLUTION INTRAMUSCULAR; INTRAVENOUS; SUBCUTANEOUS EVERY 5 MIN PRN
Status: DISCONTINUED | OUTPATIENT
Start: 2025-01-12 | End: 2025-01-12 | Stop reason: HOSPADM

## 2025-01-12 RX ORDER — MORPHINE SULFATE 4 MG/ML
4 INJECTION, SOLUTION INTRAMUSCULAR; INTRAVENOUS EVERY 10 MIN PRN
Status: DISCONTINUED | OUTPATIENT
Start: 2025-01-12 | End: 2025-01-12 | Stop reason: HOSPADM

## 2025-01-12 RX ORDER — MEPERIDINE HYDROCHLORIDE 25 MG/ML
12.5 INJECTION INTRAMUSCULAR; INTRAVENOUS; SUBCUTANEOUS AS NEEDED
Status: DISCONTINUED | OUTPATIENT
Start: 2025-01-12 | End: 2025-01-12 | Stop reason: HOSPADM

## 2025-01-12 RX ADMIN — MIDAZOLAM HYDROCHLORIDE 2 MG: 1 INJECTION INTRAMUSCULAR; INTRAVENOUS at 02:10:00

## 2025-01-12 RX ADMIN — ONDANSETRON 4 MG: 2 INJECTION INTRAMUSCULAR; INTRAVENOUS at 02:19:00

## 2025-01-12 RX ADMIN — SODIUM CHLORIDE, SODIUM LACTATE, POTASSIUM CHLORIDE, CALCIUM CHLORIDE: 600; 310; 30; 20 INJECTION, SOLUTION INTRAVENOUS at 02:24:00

## 2025-01-12 RX ADMIN — SODIUM CHLORIDE, SODIUM LACTATE, POTASSIUM CHLORIDE, CALCIUM CHLORIDE: 600; 310; 30; 20 INJECTION, SOLUTION INTRAVENOUS at 02:05:00

## 2025-01-12 NOTE — ANESTHESIA POSTPROCEDURE EVALUATION
Patient: Bc Gallegos    Procedure Summary       Date: 01/12/25 Room / Location: [facility] ENDOSCOPY 01 / [facility] ENDOSCOPY    Anesthesia Start: 0205 Anesthesia Stop: 0230    Procedure: ESOPHAGOGASTRODUODENOSCOPY (EGD) with food disimpaction with biopsies Diagnosis: (food impaction)    Surgeons: Baldemar Bach MD Anesthesiologist: Ramses Lou MD    Anesthesia Type: general ASA Status: 2 - Emergent            Anesthesia Type: general    Vitals Value Taken Time   /90 01/12/25 0231   Temp 97.7 01/12/25 0231   Pulse 97 01/12/25 0230   Resp 18 01/12/25 0230   SpO2 96 % 01/12/25 0230   Vitals shown include unfiled device data.    [facility] AN Post Evaluation:   Patient Evaluated in PACU  Patient Participation: complete - patient participated  Level of Consciousness: awake and alert  Pain Management: adequate  Airway Patency:patent  Dental exam unchanged from preop  Yes    Nausea/Vomiting: none  Cardiovascular Status: acceptable  Respiratory Status: acceptable  Postoperative Hydration acceptable      RAMSES LOU MD  1/12/2025 2:31 AM

## 2025-01-12 NOTE — ANESTHESIA PROCEDURE NOTES
Airway  Date/Time: 1/12/2025 2:11 AM  Urgency: Elective      General Information and Staff    Patient location during procedure: OR  Anesthesiologist: Aron Lou MD  Performed: anesthesiologist   Performed by: Aron Lou MD  Authorized by: Aron Lou MD      Indications and Patient Condition  Indications for airway management: anesthesia  Sedation level: deep  Preoxygenated: yes  Patient position: sniffing  Mask difficulty assessment: 1 - vent by mask    Final Airway Details  Final airway type: endotracheal airway      Successful airway: ETT  Cuffed: yes   Successful intubation technique: Video laryngoscopy  Facilitating devices/methods: intubating stylet  Endotracheal tube insertion site: oral  Blade size: #3  ETT size (mm): 7.0    Cormack-Lehane Classification: grade I - full view of glottis  Placement verified by: capnometry   Measured from: teeth  Number of attempts at approach: 1

## 2025-01-12 NOTE — OPERATIVE REPORT
Esophagogastroduodenoscopy (EGD) Report    Bc Gallegos     1/10/1985 Age 40 year old   PCP Ed Zhou MD Endoscopist Baldemar Bach MD     Date of procedure: 25    Procedure: EGD w/ foreign body removal     Pre-operative diagnosis: esophageal obstruction due to food impaction    Post-operative diagnosis: see impression    Sedation:  monitored anesthesia care (MAC)    Consent: We discussed the risks/benefits and alternatives to this procedure, as well as the planned sedation. Informed consent was obtained from the patient after the risks of the procedure were discussed, including but not limited to bleeding, perforation, aspiration, infection, or possibility of a missed lesion as well as the risks of anesthesia including but not limited to cardiopulmonary complications. The patient signed informed consent and elected to proceed with EGD with intervention i.e. Biopsy, control of bleeding, dilatation, polypectomy, endoscopic mucosal resection, etc. as indicated.    EGD procedure: The patient was placed in the left lateral decubitus position and begun on continuous blood pressure pulse oximetry and EKG monitoring and this was maintained throughout the procedure. Once an adequate level of sedation was obtained a bite block was placed. Then the lubricated tip of the [id number] diagnostic video upper endoscope was carefully inserted and advanced using direct visualization into the posterior pharynx and ultimately into the esophagus, stomach, and duodenum. Air was then withdrawn and the endoscope was removed. The patient tolerated the procedure well.    Estimated blood loss: insignificant    Specimens collected:  esophageal biopsies    Complications: none    EGD findings:      The standard adult gastroscope was advanced into the oropharynx and esophagus unremarkably.  It was then advanced distally.  There was a small pool of clear liquid which was suctioned entirely.  There was a large  obstructing food bolus visualized at approximately 30 cm.  A small cold snare was placed around the proximal end of the food bolus and a moderate to large portion removed from the oropharynx and patient. The endoscope was then advanced back distally towards the distal esophagus where a subsequent moderate to large sized remaining food bolus was still visualized but was able to pass into the stomach easily with gentle pressure.  The food bolus was then visualized within the stomach.  The endoscope was passed into the distal stomach toward the antrum and pylorus which was patent.  The endoscope passed into the duodenum.  The first and second portions of the duodenum appeared unremarkable.  The endoscope was then withdrawn back into the stomach.  The stomach distended normally.  Normal rugal folds were seen.  The gastric mucosa appeared unremarkable.  Retroflexed view of the stomach showed an unremarkable cardia and fundus.  The endoscope was then withdrawn back into the distal esophagus.  The squamocolumnar junction appeared normal.  There was an appearance of the esophagus throughout suspicious for eosinophilic esophagitis with rings, edema, linear furrowing throughout.  Multiple cold forceps biopsies were obtained in the distal esophagus and additionally in the mid/proximal esophagus.  The endoscope was then removed and procedure completed.    Impression:  Esophageal obstruction from food impaction s/p successful removal/clearance  Appearance of the esophagus suspicious for eosinophilic esophagitis biopised    Recommend:  Await pathology results  Clear liquid diet for the next 2-4 hours and then advance to soft diet   Follow up in GI clinic on a routine basis by calling [phone]      MD Vance [facility] - Gastroenterology  1/12/2025

## 2025-01-12 NOTE — ED PROVIDER NOTES
Patient Seen in: [facility] Emergency Department    History     Chief Complaint   Patient presents with    FB in Throat     Stated Complaint: Food stuck in throat    HPI       Patient is a 40 year old male hx of HLD presents with sensation of esophageal foreign body.  Patient was eating steak about 4 hours ago and states bit off a large piece and feels as if it is stuck in his mid-neck.  Patient is not able to tolerate his own secretions.  Denies vomiting.  He denies hx of esophageal stricture, states has had this happen with food previously but he's usually able to get it down with soft drinks at home. Unfortunately he tried this, walking around, jumping up and down at home without relief.  No difficulty breathing. No abdominal pain or other complaints.     Past Medical History:    Androgenetic alopecia    Mixed hyperlipidemia    Seasonal allergies    Shingles       History reviewed. No pertinent surgical history.         Family History   Problem Relation Age of Onset    Hyperlipidemia Mother     Kidney Disease Mother         Congenital    Heart Attack Mother 64    Skin cancer Father         Basal cell    Thyroid disease Father     Allergies Father     Hyperlipidemia Father     No Known Problems Sister     No Known Problems Brother     COPD Maternal Grandmother     Hyperlipidemia Maternal Grandmother     Other (Aortic aneurysm) Maternal Grandmother     Cancer Maternal Grandfather         Bladder    Cancer Paternal Grandmother         Lung    Cancer Paternal Grandfather         Throat    Prostate Cancer Neg        Social History     Socioeconomic History    Marital status:    Tobacco Use    Smoking status: Never    Smokeless tobacco: Never   Vaping Use    Vaping status: Never Used   Substance and Sexual Activity    Alcohol use: Yes     Comment: Occasional - weekends - 4 drinks over the week    Drug use: Yes     Types: Cannabis     Comment: edibles    Sexual activity: Yes     Partners: Female      Birth control/protection: OCP   Social History Narrative    Relationships: Wife - Rosalba     Children: Aron (5M), Mica (3F)     Pets: None    School: N/A    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     Origin: From the [facility] [place].     Interests: Enjoys running. Biking - hybrid with kids.     Spiritual: Not Yarsani, not spiritual       Review of Systems    Positive for stated complaint: Food stuck in throat  Other systems are as noted in HPI.  Constitutional and vital signs reviewed.      All other systems reviewed and negative except as noted above.    PSFH elements reviewed from today and agreed except as otherwise stated in HPI.    Physical Exam     ED Triage Vitals [01/11/25 2254]   /88   Pulse 93   Resp 20   Temp 97.9 °F (36.6 °C)   Temp src Temporal   SpO2 97 %   O2 Device None (Room air)       Current:/88   Pulse 93   Temp 97.9 °F (36.6 °C) (Temporal)   Resp 20   Ht 180.3 cm (5' 11\")   Wt 88.5 kg   SpO2 97%   BMI 27.20 kg/m²   PULSE OX 97% on RA   GENERAL: appears well   HEAD: normocephalic, atraumatic,   EYES: PERRLA, EOMI, conj sclera clear  THROAT: mmm, no lesions, no stridor, posterior pharynx patent, spitting up secretions  NECK: supple, no meningeal signs  LUNGS: no resp distress, cta bilateral  CARDIO: RRR without murmur  GI: no abdominal tenderness    EXTREMITIES: from, 5/5 strength in all 4 ext, no edema  NEURO: alert and oiented *3, 2-12 intact, no focal deficit noted  SKIN: good skin turgor, no  rashes  PSYCH: calm, cooperative,        ED Course   Labs Reviewed - No data to display    MDM     40M here with esophageal food bolus. Has already tried multiple methods to remove including carbonated beverage and jumping at home. Differential includes: esophageal food impaction vs. zenkers diverticulum vs. Stricture    Plan: glucagon, however if no relief will d/w Dr. Bach with GI for endoscopic removal     ED Course as of 01/12/25  0056  ------------------------------------------------------------  Time: 01/12 0056  Comment: Dr Bach consulted will come in to see pt. Pt updated. Still not able to tolerate his secretions but maintaining airway. Plan for endo lab.              Disposition and Plan     Clinical Impression:  1. Food impaction of esophagus, initial encounter         Disposition:  Send to or/cath/gi  1/12/2025 12:50 am    Follow-up:  Ed Zhou MD  [address] [facility] [address] [phone]    Schedule an appointment as soon as possible for a visit in 2 day(s)      Baldemar Bach MD  [address] [facility] [address] [phone]    Schedule an appointment as soon as possible for a visit in 1 week(s)      [facility] Emergency Department  [address] [facility] [address] [phone]  Go to  If symptoms worsen, immediately          Medications Prescribed:  Current Discharge Medication List              Supplementary Documentation:                                   Niki Lundy DO  Attending Physician  Emergency Medicine

## 2025-01-12 NOTE — INTERVAL H&P NOTE
Pre-op Diagnosis: esophageal obstruction from food impaction    The above referenced H&P was reviewed by Baldemar Bach MD on 1/12/2025, the patient was examined and no significant changes have occurred in the patient's condition since the H&P was performed.  I discussed with the patient and/or legal representative the potential benefits, risks and side effects of this procedure; the likelihood of the patient achieving goals; and potential problems that might occur during recuperation.  I discussed reasonable alternatives to the procedure, including risks, benefits and side effects related to the alternatives and risks related to not receiving this procedure.  We will proceed with procedure as planned.

## 2025-01-12 NOTE — CONSULTS
[facility]   Gastroenterology Consultation Note     Bc Gallegos  Patient Status:    Emergency  Date of Admission:         1/11/2025, Hospital day #0  Attending:   Niki Lundy DO  PCP:     Ed Zhou MD    Reason for Consultation:  Esophageal obstruction from food impaction    History of Present Illness:  Bc Gallegos is a 40 year old man with history of BMI of 27, hyperlipidemia, seasonal allergies who presented to the emergency department in the last 2 to 3 hours with a food impaction    With his wife.  Says over the last perhaps 20 years has had a dozen episodes that are typically transient where he has the feeling of food getting stuck in his esophagus after eating which tends to go away either with time or perhaps things he is tried like soda.  Says he did eat steak this evening which perhaps was a larger piece and felt like it got stuck in his not going down.  Says nothing is going down including his saliva and secretions.  Has to spit into a basin at bedside.  Is uncomfortable.  Has never had upper endoscopy before.    History:  Past Medical History:    Androgenetic alopecia    Mixed hyperlipidemia    Seasonal allergies    Shingles     History reviewed. No pertinent surgical history.  Family History   Problem Relation Age of Onset    Hyperlipidemia Mother     Kidney Disease Mother         Congenital    Heart Attack Mother 64    Skin cancer Father         Basal cell    Thyroid disease Father     Allergies Father     Hyperlipidemia Father     No Known Problems Sister     No Known Problems Brother     COPD Maternal Grandmother     Hyperlipidemia Maternal Grandmother     Other (Aortic aneurysm) Maternal Grandmother     Cancer Maternal Grandfather         Bladder    Cancer Paternal Grandmother         Lung    Cancer Paternal Grandfather         Throat    Prostate Cancer Neg       reports that he has never smoked. He has never used smokeless tobacco. He reports current  alcohol use. He reports current drug use. Drug: Cannabis.    Allergies:  Allergies[1]    Medications:  No current facility-administered medications for this encounter.    Review of Systems:  Systems were reviewed and were negative except as noted in the HPI    Physical Exam:    Blood pressure 130/77, pulse 74, temperature 97.9 °F (36.6 °C), temperature source Temporal, resp. rate 20, height 5' 11\" (1.803 m), weight 195 lb (88.5 kg), SpO2 97%. Body mass index is 27.2 kg/m².    General:awake, cooperative, no acute distress though uncomfortable appearing spitting in basin  HEENT: EOMI, no scleral icterus, MMM; oral pharnyx is without exudates or lesions  Neck: no lymphadenopathy; thyroid is not enlarged and without nodules  CV: RRR  Resp: non-labored breathing  Abd: soft, non-tender, non-distended  Ext: no lower extremity swelling  Neuro: Alert, Oriented X 3  Skin: no rashes, bruises  Psych: normal affect    Laboratory Data:       Imaging:  No results found.    Assessment & Plan   Bc Gallegos is a 40 year old man with history of BMI of 27, hyperlipidemia, seasonal allergies who presented to the emergency department in the last 2 to 3 hours with a food impaction    Patient here with of esophageal obstruction from food impaction.  Discussed plan for emergent upper endoscopy for evaluation and management.  We did discuss possibility of underlying eosinophilic esophagitis.    Recommend:  -emergent EGD with general anesthesia     EGD Consent: I have discussed the risks, benefits, and alternatives to EGD with the patient [who demonstrated understanding], including but not limited to the risks of bleeding, infection, pain, perforation as well as the cardiopulmonary risks of anesthesia all leading to prolonged hospital stay or surgical intervention. All questions were answered to the patient’s satisfaction. The patient elected to proceed with EGD with intervention [i.e. Biopsy, dilatation, control of bleeding, etc.] as  indicated.    Baldemar Bach MD  [facility] - Gastroenterology  1/12/2025         [1] No Known Allergies

## 2025-01-12 NOTE — ANESTHESIA PREPROCEDURE EVALUATION
Anesthesia PreOp Note    HPI:     Bc Gallegos is a 40 year old male who presents for preoperative consultation requested by: Baldemar Bach MD    Date of Surgery: 1/11/2025 - 1/12/2025    Procedure(s):  ESOPHAGOGASTRODUODENOSCOPY (EGD)  Indication: bleed    Relevant Problems   No relevant active problems       NPO:  Last Liquid Consumption Date: 01/11/25  Last Liquid Consumption Time: 0000  Last Solid Consumption Date: 01/11/25  Last Solid Consumption Time: 0000  Last Liquid Consumption Date: 01/11/25          History Review:  Patient Active Problem List    Diagnosis Date Noted    Food impaction of esophagus 01/12/2025    Food impaction of esophagus, initial encounter 01/12/2025    Seasonal allergies 01/16/2023    Health maintenance examination 01/16/2023    Overweight (BMI 25.0-29.9) 01/16/2023    Mixed hyperlipidemia 01/16/2023    Elevated liver enzymes 01/16/2023    Androgenetic alopecia 11/13/2018       Past Medical History:    Androgenetic alopecia    High cholesterol    Mixed hyperlipidemia    Seasonal allergies    Shingles       History reviewed. No pertinent surgical history.    Prescriptions Prior to Admission[1]  Current Medications and Prescriptions Ordered in Epic[2]    Allergies[3]    Family History   Problem Relation Age of Onset    Hyperlipidemia Mother     Kidney Disease Mother         Congenital    Heart Attack Mother 64    Skin cancer Father         Basal cell    Thyroid disease Father     Allergies Father     Hyperlipidemia Father     No Known Problems Sister     No Known Problems Brother     COPD Maternal Grandmother     Hyperlipidemia Maternal Grandmother     Other (Aortic aneurysm) Maternal Grandmother     Cancer Maternal Grandfather         Bladder    Cancer Paternal Grandmother         Lung    Cancer Paternal Grandfather         Throat    Prostate Cancer Neg      Social History     Socioeconomic History    Marital status:    Tobacco Use    Smoking status: Never     Smokeless tobacco: Never   Vaping Use    Vaping status: Never Used   Substance and Sexual Activity    Alcohol use: Yes     Comment: Occasional - weekends - 4 drinks over the week    Drug use: Yes     Types: Cannabis     Comment: edibles    Sexual activity: Yes     Partners: Female     Birth control/protection: OCP       Available pre-op labs reviewed.             Vital Signs:  Body mass index is 27.2 kg/m².   height is 1.803 m (5' 11\") and weight is 88.5 kg (195 lb). His temporal temperature is 97.9 °F (36.6 °C). His blood pressure is 133/91 (abnormal) and his pulse is 88. His respiration is 12 and oxygen saturation is 95%.   Vitals:    01/11/25 2254 01/12/25 0100 01/12/25 0151   BP: 137/88 130/77 (!) 133/91   Pulse: 93 74 88   Resp: 20  12   Temp: 97.9 °F (36.6 °C)     TempSrc: Temporal     SpO2: 97%  95%   Weight: 88.5 kg (195 lb)     Height: 1.803 m (5' 11\")          Anesthesia Evaluation     Patient summary reviewed and Nursing notes reviewed    Airway   Mallampati: II  TM distance: >3 FB  Neck ROM: full  Dental      Pulmonary - negative ROS and normal exam    breath sounds clear to auscultation  Cardiovascular - negative ROS and normal exam    Rhythm: regular  Rate: normal    Neuro/Psych - negative ROS     GI/Hepatic/Renal - negative ROS     Endo/Other - negative ROS   Abdominal                  Anesthesia Plan:   ASA:  2  Emergent    Plan:   General  Airway:  ETT  Post-op Pain Management: IV analgesics  Informed Consent Plan and Risks Discussed With:  Patient      I have informed Bc Gallegos and/or legal guardian or family member of the nature of the anesthetic plan, benefits, risks including possible dental damage if relevant, major complications, and any alternative forms of anesthetic management.   All of the patient's questions were answered to the best of my ability. The patient desires the anesthetic management as planned.  RAMSES MARCH MD  1/12/2025 1:57 AM  Present on  Admission:  **None**           [1]   Medications Prior to Admission   Medication Sig Dispense Refill Last Dose/Taking    Minoxidil (ROGAINE MENS) 5 % External Foam Apply topically.      [2]   No current Epic-ordered facility-administered medications on file.     No current [facility]-ordered outpatient medications on file.   [3] No Known Allergies

## 2025-01-12 NOTE — H&P (VIEW-ONLY)
[facility]   Gastroenterology Consultation Note     Bc Gallegos  Patient Status:    Emergency  Date of Admission:         1/11/2025, Hospital day #0  Attending:   Niki Lundy DO  PCP:     Ed Zhou MD    Reason for Consultation:  Esophageal obstruction from food impaction    History of Present Illness:  cB Gallegos is a 40 year old man with history of BMI of 27, hyperlipidemia, seasonal allergies who presented to the emergency department in the last 2 to 3 hours with a food impaction    With his wife.  Says over the last perhaps 20 years has had a dozen episodes that are typically transient where he has the feeling of food getting stuck in his esophagus after eating which tends to go away either with time or perhaps things he is tried like soda.  Says he did eat steak this evening which perhaps was a larger piece and felt like it got stuck in his not going down.  Says nothing is going down including his saliva and secretions.  Has to spit into a basin at bedside.  Is uncomfortable.  Has never had upper endoscopy before.    History:  Past Medical History:    Androgenetic alopecia    Mixed hyperlipidemia    Seasonal allergies    Shingles     History reviewed. No pertinent surgical history.  Family History   Problem Relation Age of Onset    Hyperlipidemia Mother     Kidney Disease Mother         Congenital    Heart Attack Mother 64    Skin cancer Father         Basal cell    Thyroid disease Father     Allergies Father     Hyperlipidemia Father     No Known Problems Sister     No Known Problems Brother     COPD Maternal Grandmother     Hyperlipidemia Maternal Grandmother     Other (Aortic aneurysm) Maternal Grandmother     Cancer Maternal Grandfather         Bladder    Cancer Paternal Grandmother         Lung    Cancer Paternal Grandfather         Throat    Prostate Cancer Neg       reports that he has never smoked. He has never used smokeless tobacco. He reports current  alcohol use. He reports current drug use. Drug: Cannabis.    Allergies:  Allergies[1]    Medications:  No current facility-administered medications for this encounter.    Review of Systems:  Systems were reviewed and were negative except as noted in the HPI    Physical Exam:    Blood pressure 130/77, pulse 74, temperature 97.9 °F (36.6 °C), temperature source Temporal, resp. rate 20, height 5' 11\" (1.803 m), weight 195 lb (88.5 kg), SpO2 97%. Body mass index is 27.2 kg/m².    General:awake, cooperative, no acute distress though uncomfortable appearing spitting in basin  HEENT: EOMI, no scleral icterus, MMM; oral pharnyx is without exudates or lesions  Neck: no lymphadenopathy; thyroid is not enlarged and without nodules  CV: RRR  Resp: non-labored breathing  Abd: soft, non-tender, non-distended  Ext: no lower extremity swelling  Neuro: Alert, Oriented X 3  Skin: no rashes, bruises  Psych: normal affect    Laboratory Data:       Imaging:  No results found.    Assessment & Plan   Bc Gallegos is a 40 year old man with history of BMI of 27, hyperlipidemia, seasonal allergies who presented to the emergency department in the last 2 to 3 hours with a food impaction    Patient here with of esophageal obstruction from food impaction.  Discussed plan for emergent upper endoscopy for evaluation and management.  We did discuss possibility of underlying eosinophilic esophagitis.    Recommend:  -emergent EGD with general anesthesia     EGD Consent: I have discussed the risks, benefits, and alternatives to EGD with the patient [who demonstrated understanding], including but not limited to the risks of bleeding, infection, pain, perforation as well as the cardiopulmonary risks of anesthesia all leading to prolonged hospital stay or surgical intervention. All questions were answered to the patient’s satisfaction. The patient elected to proceed with EGD with intervention [i.e. Biopsy, dilatation, control of bleeding, etc.] as  indicated.    Baldemar Bach MD  [facility] - Gastroenterology  1/12/2025         [1] No Known Allergies

## 2025-01-12 NOTE — ED INITIAL ASSESSMENT (HPI)
Patient arrives with reports of food feeling stuck after eating steak at dinner. Patient reports hard to manage secretions at this time, needing to spit. Breathing even and unlabored. Aox4.

## 2025-01-12 NOTE — DISCHARGE INSTRUCTIONS
Thank you for seeking care at [facility] Emergency Department.  You have been seen and evaluated.    We discussed the results of your workup   Please read the instructions provided   If given prescriptions, take as instructed    Remember, your care process does not end after your visit today. Please follow-up with your doctor within 1-2 days for a follow-up check to ensure you are  improving, to see if you need any further evaluation/testing, or to evaluate for any alternate diagnoses.     Please return to the emergency department if you develop difficulty swallowing, chest pain, difficulty breathing, inability to drink liquids without vomiting, one sided numbness or weakness, slurred speech, severe headache, or if you develop any other new or concerning symptoms as these could be signs of more serious medical illness.    We hope you feel better.  Home Care Instructions for Gastroscopy with Sedation    Diet:  - Resume your regular diet as tolerated unless otherwise instructed.  - Start with light meals to minimize bloating.  - Do not drink alcohol today.    Medication:  - If you have questions about resuming your normal medications, please contact your Primary Care Physician.    Activities:  - Take it easy today. Do not return to work today.  - Do not drive today.  - Do not operate any machinery today (including kitchen equipment).    Gastroscopy:  - You may have a sore throat for 2-3 days following the exam. This is normal. Gargling with warm salt water (1/2 tsp salt to 1 glass warm water) or using throat lozenges will help.  - If you experience any sharp pain in your neck, abdomen or chest, vomiting of blood, oral temperature over 100 degrees Fahrenheit, light-headedness or dizziness, or any other problems, contact your doctor.    **If unable to reach your doctor, please go to the [facility] Emergency Room**    - Your referring physician will receive a full report of your examination.  - If you  do not hear from your doctor's office within two weeks of your biopsy, please call them for your results.    You may be able to see your laboratory results in [employer] between 4 and 7 business days.  In some cases, your physician may not have viewed the results before they are released to [employer].  If you have questions regarding your results contact the physician who ordered the test/exam by phone or via [employer] by choosing \"Ask a Medical Question.\"

## 2025-01-14 ENCOUNTER — TELEPHONE (OUTPATIENT)
Facility: CLINIC | Age: 40
End: 2025-01-14

## 2025-01-14 DIAGNOSIS — K20.0 EOSINOPHILIC ESOPHAGITIS: Primary | ICD-10-CM

## 2025-01-14 NOTE — TELEPHONE ENCOUNTER
Spoke to patient and reviewed note below. Patient would like to do some research on medication prior to it being sent in. Patient will call or Mychart us when ready to start medication or if any questions.

## 2025-01-14 NOTE — TELEPHONE ENCOUNTER
Gi staff:    Please call the patient to let him know his biopsies do confirm eosinophilic esophagitis which is the condition I discussed extensively with him and his wife. As we discussed then, I do recommend starting a PPI like omeprazole which I will send to his pharmacy to take once a day. Should schedule a routine follow up appt with me. Please confirm pharmacy.    Thanks    MD Vance Isidro-[facility] - Gastroenterology  1/14/2025  3:18 PM

## 2025-01-15 NOTE — TELEPHONE ENCOUNTER
Sent    Thanks    MD Vance Isidro-[facility] - Gastroenterology  1/15/2025  3:23 PM

## (undated) DEVICE — YANKAUER,BULB TIP,W/O VENT,RIGID,STERILE: Brand: MEDLINE

## (undated) DEVICE — 60 ML SYRINGE REGULAR TIP: Brand: MONOJECT

## (undated) DEVICE — CONMED SCOPE SAVER BITE BLOCK, 20X27 MM: Brand: SCOPE SAVER

## (undated) DEVICE — Device

## (undated) DEVICE — V2 SPECIMEN COLLECTION MANIFOLD KIT: Brand: NEPTUNE

## (undated) DEVICE — MEDI-VAC NON-CONDUCTIVE SUCTION TUBING: Brand: CARDINAL HEALTH

## (undated) DEVICE — FORCEPS BX LG 2.4MM X 240CM NDL RAD JAW 4

## (undated) DEVICE — SINGLE-USE SNARE: Brand: CAPTIVATOR™ COLD

## (undated) DEVICE — KIT CLEAN ENDOKIT 1.1OZ GOWNX2

## (undated) DEVICE — KIT ENDO ORCAPOD 160/180/190

## (undated) DEVICE — MEDI-VAC NON-CONDUCTIVE SUCTION TUBING 6MM X 1.8M (6FT.) L: Brand: CARDINAL HEALTH

## (undated) NOTE — LETTER
[facility]  Administration of Anesthesia  I, Bc aGllegos agree to be cared for by a physician anesthesiologist alone and/or with a nurse anesthetist, who is specially trained to monitor me and give me medicine to put me to sleep or keep me comfortable during my procedure    I understand that my anesthesiologist and/or anesthetist is not an employee or agent of [facility] or [employer]. He or she works for [facility] [employer]    As the patient asking for anesthesia services, I agree to:  Allow the anesthesiologist (anesthesia doctor) to give me medicine and do additional procedures as necessary. Some examples are: Starting or using an “IV” to give me medicine, fluids or blood during my procedure, and having a breathing tube placed to help me breathe when I’m asleep (intubation). In the event that my heart stops working properly, I understand that my anesthesiologist will make every effort to sustain my life, unless otherwise directed by [facility] Do Not Resuscitate documents.  Tell my anesthesia doctor before my procedure:  If I am pregnant.  The last time that I ate or drank.  iii. All of the medicines I take (including prescriptions, herbal supplements, and pills I can buy without a prescription (including street drugs/illegal medications). Failure to inform my anesthesiologist about these medicines may increase my risk of anesthetic complications.  iv.If I am allergic to anything or have had a reaction to anesthesia before.  I understand how the anesthesia medicine will help me (benefits).  I understand that with any type of anesthesia medicine there are risks:  The most common risks are: nausea, vomiting, sore throat, muscle soreness, damage to my eyes, mouth, or teeth (from breathing tube placement).  Rare risks include: remembering what happened during my procedure, allergic reactions to medications, injury to my airway, heart, lungs, vision, nerves, or  muscles and in extremely rare instances death.  My doctor has explained to me other choices available to me for my care (alternatives).  Pregnant Patients (“epidural”):  I understand that the risks of having an epidural (medicine given into my back to help control pain during labor), include itching, low blood pressure, difficulty urinating, headache or slowing of the baby’s heart. Very rare risks include infection, bleeding, seizure, irregular heart rhythms and nerve injury.  Regional Anesthesia (“spinal”, “epidural”, & “nerve blocks”):  I understand that rare but potential complications include headache, bleeding, infection, seizure, irregular heart rhythms, and nerve injury.    _____________________________________________________________________________  Patient (or Representative) Signature/Relationship to Patient  Date   Time    _____________________________________________________________________________   Name (if used)    Language/Organization   Time    _____________________________________________________________________________  Nurse Anesthetist Signature     Date   Time  _____________________________________________________________________________  Anesthesiologist Signature     Date   Time  I have discussed the procedure and information above with the patient (or patient’s representative) and answered their questions. The patient or their representative has agreed to have anesthesia services.    _____________________________________________________________________________  Witness        Date   Time  I have verified that the signature is that of the patient or patient’s representative, and that it was signed before the procedure  Patient Name: Bc Gallegos     : 1/10/1985                 Printed: 2025 at 1:34 AM    Medical Record #: [id number]                                            Page 1 of 1  ----------ANESTHESIA CONSENT----------

## (undated) NOTE — LETTER
[facility]  [address], [place], [place]    Authorization for Surgical Operation and Procedure                               I hereby authorize Baldemar Bach MD, my physician and his/her assistants (if applicable), which may include medical students, residents, and/or fellows, to perform the following surgical operation/ procedure and administer such anesthesia as may be determined necessary by my physician: Operation/Procedure name (s) ESOPHAGOGASTRODUODENOSCOPY (EGD) with Foreign body removal on Bc Gallegos   2.   I recognize that during the surgical operation/procedure, unforeseen conditions may necessitate additional or different procedures than those listed above.  I, therefore, further authorize and request that the above-named surgeon, assistants, or designees perform such procedures as are, in their judgment, necessary and desirable.    3.   My surgeon/physician has discussed prior to my surgery the potential benefits, risks and side effects of this procedure; the likelihood of achieving goals; and potential problems that might occur during recuperation.  They also discussed reasonable alternatives to the procedure, including risks, benefits, and side effects related to the alternatives and risks related to not receiving this procedure.  I have had all my questions answered and I acknowledge that no guarantee has been made as to the result that may be obtained.    4.   Should the need arise during my operation/procedure, which includes change of level of care prior to discharge, I also consent to the administration of blood and/or blood products.  Further, I understand that despite careful testing and screening of blood or blood products by collecting agencies, I may still be subject to ill effects as a result of receiving a blood transfusion and/or blood products.  The following are some, but not all, of the potential risks that can occur: fever and allergic reactions,  hemolytic reactions, transmission of diseases such as Hepatitis, AIDS and Cytomegalovirus (CMV) and fluid overload.  In the event that I wish to have an autologous transfusion of my own blood, or a directed donor transfusion, I will discuss this with my physician.  Check only if Refusing Blood or Blood Products  I understand refusal of blood or blood products as deemed necessary by my physician may have serious consequences to my condition to include possible death. I hereby assume responsibility for my refusal and release the hospital, its personnel, and my physicians from any responsibility for the consequences of my refusal.    o  Refuse   5.   I authorize the use of any specimen, organs, tissues, body parts or foreign objects that may be removed from my body during the operation/procedure for diagnosis, research or teaching purposes and their subsequent disposal by hospital authorities.  I also authorize the release of specimen test results and/or written reports to my treating physician on the hospital medical staff or other referring or consulting physicians involved in my care, at the discretion of the Pathologist or my treating physician.    6.   I consent to the photographing or videotaping of the operations or procedures to be performed, including appropriate portions of my body for medical, scientific, or educational purposes, provided my identity is not revealed by the pictures or by descriptive texts accompanying them.  If the procedure has been photographed/videotaped, the surgeon will obtain the original picture, image, videotape or CD.  The hospital will not be responsible for storage, release or maintenance of the picture, image, tape or CD.    7.   I consent to the presence of a  or observers in the operating room as deemed necessary by my physician or their designees.    8.   I recognize that in the event my procedure results in extended X-Ray/fluoroscopy time, I may develop a  skin reaction.    9. If I have a Do Not Attempt Resuscitation (DNAR) order in place, that status will be suspended while in the operating room, procedural suite, and during the recovery period unless otherwise explicitly stated by me (or a person authorized to consent on my behalf). The surgeon or my attending physician will determine when the applicable recovery period ends for purposes of reinstating the DNAR order.  10. Patients having a sterilization procedure: I understand that if the procedure is successful the results will be permanent and it will therefore be impossible for me to inseminate, conceive, or bear children.  I also understand that the procedure is intended to result in sterility, although the result has not been guaranteed.   11. I acknowledge that my physician has explained sedation/analgesia administration to me including the risk and benefits I consent to the administration of sedation/analgesia as may be necessary or desirable in the judgment of my physician.    I CERTIFY THAT I HAVE READ AND FULLY UNDERSTAND THE ABOVE CONSENT TO OPERATION and/or OTHER PROCEDURE.     ____________________________________  _________________________________        ______________________________  Signature of Patient    Signature of Responsible Person                Printed Name of Responsible Person                                      ____________________________________  _____________________________                ________________________________  Signature of Witness        Date  Time         Relationship to Patient    STATEMENT OF PHYSICIAN My signature below affirms that prior to the time of the procedure; I have explained to the patient and/or his/her legal representative, the risks and benefits involved in the proposed treatment and any reasonable alternative to the proposed treatment. I have also explained the risks and benefits involved in refusal of the proposed treatment and alternatives to the  proposed treatment and have answered the patient's questions. If I have a significant financial interest in a co-management agreement or a significant financial interest in any product or implant, or other significant relationship used in this procedure/surgery, I have disclosed this and had a discussion with my patient.     _____________________________________________________              _____________________________  (Signature of Physician)                                                                                         (Date)                                   (Time)  Patient Name: Bc Isaac Rosy      : 1/10/1985      Printed: 2025     Medical Record #: [id number]                                      Page 1 of 1